# Patient Record
Sex: FEMALE | Race: OTHER | HISPANIC OR LATINO
[De-identification: names, ages, dates, MRNs, and addresses within clinical notes are randomized per-mention and may not be internally consistent; named-entity substitution may affect disease eponyms.]

---

## 2023-03-28 ENCOUNTER — NON-APPOINTMENT (OUTPATIENT)
Age: 48
End: 2023-03-28

## 2023-03-28 PROBLEM — Z00.00 ENCOUNTER FOR PREVENTIVE HEALTH EXAMINATION: Status: ACTIVE | Noted: 2023-03-28

## 2023-04-13 NOTE — PHYSICAL EXAM
[Supple] : supple [No Supraclavicular Adenopathy] : no supraclavicular adenopathy [Examined in the supine and seated position] : examined in the supine and seated position [No dominant masses] : no dominant masses in right breast  [No dominant masses] : no dominant masses left breast [No Nipple Retraction] : no left nipple retraction [No Nipple Discharge] : no left nipple discharge [No Axillary Lymphadenopathy] : no left axillary lymphadenopathy

## 2023-04-17 ENCOUNTER — APPOINTMENT (OUTPATIENT)
Dept: BREAST CENTER | Facility: CLINIC | Age: 48
End: 2023-04-17
Payer: COMMERCIAL

## 2023-04-17 VITALS
BODY MASS INDEX: 25.87 KG/M2 | HEART RATE: 73 BPM | HEIGHT: 63 IN | SYSTOLIC BLOOD PRESSURE: 121 MMHG | WEIGHT: 146 LBS | OXYGEN SATURATION: 98 % | DIASTOLIC BLOOD PRESSURE: 77 MMHG

## 2023-04-17 DIAGNOSIS — Z80.42 FAMILY HISTORY OF MALIGNANT NEOPLASM OF PROSTATE: ICD-10-CM

## 2023-04-17 DIAGNOSIS — Z78.9 OTHER SPECIFIED HEALTH STATUS: ICD-10-CM

## 2023-04-17 DIAGNOSIS — Z80.0 FAMILY HISTORY OF MALIGNANT NEOPLASM OF DIGESTIVE ORGANS: ICD-10-CM

## 2023-04-17 PROCEDURE — 99204 OFFICE O/P NEW MOD 45 MIN: CPT

## 2023-04-20 NOTE — DATA REVIEWED
[FreeTextEntry1] : 1/11/23 ([facility]) B/L sMMG: heterogeneously dense. \par 1. Faint microcalcifications with questioned associated distortion left inner central to upper breast, middle to posterior thirds, approximately 8 cm deep to the nipple. Recommend diagnostic left mammogram with full 2-D and 3-D tomographic 90 degree lateral view and magnification views in CC and two-minute delayed 90 degree lateral projections, as well as ultrasound correlation.\par 2. No significant finding in the right breast.\par 3. At the time of additional imaging, bilateral whole breast ultrasound recommended due to the density of the breasts.\par FOLLOW-UP: Additional imaging. Recommend diagnostic left mammogram and bilateral whole breast ultrasound. BI-RADS Category 0:  Incomplete. Need additional imaging evaluation.\par \par 3/1/23 ([facility]) L DX MMG/US: heterogeneously dense. A biopsy clip is in the medial left breast. In the 10 o'clock position of the left breast 6 cm from the nipple, there are clustered microcalcifications; indeterminate. There are a few scattered cysts in both breasts, some with septations and/or debris. FOLLOW-UP: L Stereotactic biopsy. BI-RADS 4:  Suspicious.\par \par 3/13/23 ([facility]) Stereotactic biopsy of calcifications in the upper inner quadrant of the left breast (cork clip): Fibrocystic and columnar cell changes with associated microcalcifications and atypical lobular hyperplasia. High risk, concordant. Surgical consultation is recommended.

## 2023-04-20 NOTE — HISTORY OF PRESENT ILLNESS
[FreeTextEntry1] : 48 year old female was referred by Dr. Autumn Perez (OBGYN) who presents for initial evaluation s/p benign biopsy on 3/13/23 of indeterminate calcifications in the upper inner quadrant of the left breast, pathology revealed fibrocystic and columnar cell changes with associated microcalcifications and atypical lobular hyperplasia. Patient reports bilateral breast pain, described as an ache, sometimes sharp/shooting, unsure relation to menstrual cycles as she has been irregular. Patient denies palpable masses, nipple discharge, skin changes. \par \par Patient with history of right breast surgical biopsy >20 years ago with reported benign results and history of L breast biopsy in 2019 also reportedly benign. \par \par Patient denies family history of breast cancer. Denies famhx of ovarian cancer. \par \par Evelyn Lifetime Risk 41.3%\par

## 2023-04-20 NOTE — ASSESSMENT
[FreeTextEntry1] : 48 year old female presents for initial evaluation s/p benign biopsy on 3/13/23 of indeterminate calcifications in the upper inner quadrant of the left breast, pathology revealed fibrocystic and columnar cell changes with associated microcalcifications and atypical lobular hyperplasia. \par \par Given diagnosis of atypia, lifetime TOR risk of 41.3%. Discussed with the patient the implications for her lifetime risk, which is considered to be at high risk to develop breast cancer over the span of her lifetime and it is recommended that she undergoes high risk screening surveillance to include biannual radiological screening exams with a mammogram and screening MRI.\par \par Discussed the standard recommendation to limit alcohol intake, follow a low-fat diet, avoid smoking. Reviewed prevention options from lifestyle, radiological surveillance, anti-estrogen maintenance, and prophylactic mastectomy. \par \par Patient reports bilateral breast pain. Discussed with the patient that mastalgia is not a common sign of breast cancer. I reviewed the use of decreasing caffeine intake (includes coffee, tea, chocolate, energy drinks, soda), wearing a sports bra, evening primrose oil (high dose EPO handout was provided), and alternating cold/warm compresses.\par \par Physical exam WNL. Will refer patient to med onc to discuss chemoprevention. Plan for high risk B/L MRI and re-examination with PA in Sept 2023. Patient verbalizes understanding and is in agreement with the plan.

## 2023-04-20 NOTE — PAST MEDICAL HISTORY
[Menarche Age ____] : age at menarche was [unfilled] [Definite ___ (Date)] : the last menstrual period was [unfilled] [Total Preg ___] : G[unfilled] [Live Births ___] : P[unfilled]  [AB Spont ___] : miscarriages: [unfilled]  [Age At Live Birth ___] : Age at live birth: [unfilled] [History of Hormone Replacement Treatment] : has no history of hormone replacement treatment [FreeTextEntry6] : No [FreeTextEntry7] : No [FreeTextEntry8] : No

## 2023-04-25 ENCOUNTER — APPOINTMENT (OUTPATIENT)
Dept: OBGYN | Facility: CLINIC | Age: 48
End: 2023-04-25
Payer: COMMERCIAL

## 2023-04-25 VITALS
HEART RATE: 78 BPM | BODY MASS INDEX: 26.05 KG/M2 | DIASTOLIC BLOOD PRESSURE: 79 MMHG | SYSTOLIC BLOOD PRESSURE: 127 MMHG | OXYGEN SATURATION: 97 % | WEIGHT: 147 LBS | HEIGHT: 63 IN

## 2023-04-25 DIAGNOSIS — Z98.890 OTHER SPECIFIED POSTPROCEDURAL STATES: ICD-10-CM

## 2023-04-25 PROCEDURE — 99205 OFFICE O/P NEW HI 60 MIN: CPT

## 2023-05-01 ENCOUNTER — APPOINTMENT (OUTPATIENT)
Dept: HEMATOLOGY ONCOLOGY | Facility: CLINIC | Age: 48
End: 2023-05-01
Payer: COMMERCIAL

## 2023-05-01 VITALS
HEART RATE: 81 BPM | TEMPERATURE: 99.1 F | OXYGEN SATURATION: 98 % | BODY MASS INDEX: 25.69 KG/M2 | RESPIRATION RATE: 18 BRPM | DIASTOLIC BLOOD PRESSURE: 81 MMHG | SYSTOLIC BLOOD PRESSURE: 123 MMHG | WEIGHT: 145 LBS | HEIGHT: 63 IN

## 2023-05-01 PROCEDURE — 99203 OFFICE O/P NEW LOW 30 MIN: CPT

## 2023-05-01 RX ORDER — OMEGA-3/DHA/EPA/FISH OIL 300-1000MG
CAPSULE ORAL
Refills: 0 | Status: ACTIVE | COMMUNITY

## 2023-05-01 RX ORDER — ACETAMINOPHEN 325 MG/1
TABLET, FILM COATED ORAL
Refills: 0 | Status: ACTIVE | COMMUNITY

## 2023-05-01 RX ORDER — TURMERIC ROOT EXTRACT 500 MG
TABLET ORAL
Refills: 0 | Status: ACTIVE | COMMUNITY

## 2023-05-01 RX ORDER — GABAPENTIN 300 MG/1
300 CAPSULE ORAL
Qty: 90 | Refills: 0 | Status: DISCONTINUED | COMMUNITY
Start: 2022-11-23 | End: 2023-05-01

## 2023-05-01 RX ORDER — TIZANIDINE 2 MG/1
2 TABLET ORAL
Qty: 60 | Refills: 0 | Status: DISCONTINUED | COMMUNITY
Start: 2022-12-09 | End: 2023-05-01

## 2023-05-01 NOTE — HISTORY OF PRESENT ILLNESS
[de-identified] : 48 year old female with newly diagnosed atypical lobular hyperplasia was referred by ROSE Mcgill for chemoprevention.\par \par Patient with history of right breast surgical biopsy >20 years ago with reported benign results and history of L breast biopsy in 2019 also reportedly benign. \par \par Patient denies family history of breast cancer. Denies famhx of ovarian cancer. \par \par 1/11/23 ([facility]) B/L sMMG: heterogeneously dense. \par 1. Faint microcalcifications with questioned associated distortion left inner central to upper breast, middle to posterior thirds, approximately 8 cm deep to the nipple. \par 2. No significant finding in the right breast.\par BI-RADS Category 0\par \par 3/1/23 ([facility]) L DX MMG/US: heterogeneously dense. A biopsy clip is in the medial left breast. In the 10 o'clock position of the left breast 6 cm from the nipple, there are clustered microcalcifications; indeterminate. There are a few scattered cysts in both breasts, some with septations and/or debris. BI-RADS 4: Suspicious.\par \par 3/13/23 ([facility]) Stereotactic biopsy of calcifications in the upper inner quadrant of the left breast (cork clip): Fibrocystic and columnar cell changes with associated microcalcifications and atypical lobular hyperplasia. High risk, concordant.  [de-identified] : She saw gyn Mono Black on 4/25/23 for myomectomy and Essure removal.  Reported bone pain and "feeling like an old lady."

## 2023-05-03 ENCOUNTER — NON-APPOINTMENT (OUTPATIENT)
Age: 48
End: 2023-05-03

## 2023-05-03 PROBLEM — Z98.890 S/P BREAST BIOPSY, LEFT: Status: RESOLVED | Noted: 2023-04-13 | Resolved: 2023-05-03

## 2023-05-03 NOTE — HISTORY OF PRESENT ILLNESS
[Patient reported mammogram was abnormal] : Patient reported mammogram was abnormal [Patient reported breast sonogram was abnormal] : Patient reported breast sonogram was abnormal [Patient reported PAP Smear was normal] : Patient reported PAP Smear was normal [Patient reported colonoscopy was normal] : Patient reported colonoscopy was normal [perimenopausal] : perimenopausal [N] : Patient is not sexually active [Y] : Positive pregnancy history [Frequency: Q ___ days] : menstrual periods occur approximately every [unfilled] days [Menarche Age: ____] : age at menarche was [unfilled] [Currently Active] : currently active [Men] : men [Vaginal] : vaginal [Mammogramdate] : 03/23 [BreastSonogramDate] : 03/23 [TextBox_25] : Also had LT breast biopsy  [PapSmeardate] : 01/23 [ColonoscopyDate] : 01/20 [LMPDate] : 02/01/23 [MensesFreq] : 20-90 [PGHxTotal] : 2 [[facility] Hospital[facility]] : 2 [FreeTextEntry1] : 02/01/23

## 2023-05-03 NOTE — CONSULT LETTER
[Dear  ___] : Dear  [unfilled], [( Thank you for referring [unfilled] for consultation for _____ )] : Thank you for referring [unfilled] for consultation for [unfilled] [Please see my note below.] : Please see my note below. [Consult Closing:] : Thank you very much for allowing me to participate in the care of this patient.  If you have any questions, please do not hesitate to contact me. [Sincerely,] : Sincerely, [FreeTextEntry2] : Autumn Perez MD \par [address] [place] [address], [address] \[place] [address]\par  [FreeTextEntry3] : Mono Black MD, FACOG, FACS \par Minimally Invasive Gynecologic Surgery \par [facility] \par [address] [place] \[place] [address] \par Tel: [phone] \par Fax: [phone]\par

## 2023-05-03 NOTE — PHYSICAL EXAM
[Chaperone Present] : A chaperone was present in the examining room during all aspects of the physical examination [Appropriately responsive] : appropriately responsive [Alert] : alert [No Acute Distress] : no acute distress [Oriented x3] : oriented x3 [Tenderness] : tender [Labia Majora] : normal [Labia Minora] : normal [Normal] : normal [Anteversion] : anteverted [Uterine Adnexae] : normal

## 2023-05-03 NOTE — DISCUSSION/SUMMARY
[FreeTextEntry1] : I sat down with the patient to discuss the imaging findings & her symptoms which warrant intervention. I explained that the pelvic pain may be related to ESSURE device but also adenomyosis. We discussed medication for management of pelvic pain such as changing oral contraceptive pill, NuvaRing, LNG IUD, Depo Provera, Orillisa and Depo Lupron and pain management with NSAIDs, and surgical intervention with removal of device with cornual resection and myomectomy vs. partial hysterectomy. I explained that the healing from partial hysterectomy would be quicker and less bleeding. The options for surgical approach including open, vaginal, and laparoscopic with or without robotic assistance        The differential diagnosis was discussed in detail. \par \par The indications, risks, benefits and alternatives were discussed. but not limited to, conversion to laparotomy, bleeding, infection, injury to surrounding organs was discussed at length. Chance of occult injury and need for future surgery. KASSANDRA ARTEAGA expressed an understanding of the treatment rationale and her questions were answered to her apparent satisfaction.  KASSANDRA ARTEAGA was given written information about endometriosis and surgery, diagrams of pelvic anatomy.  All questions and concerns addressed, she expressed understanding. She wants to discuss with her  before making a decision.

## 2023-06-08 ENCOUNTER — TRANSCRIPTION ENCOUNTER (OUTPATIENT)
Age: 48
End: 2023-06-08

## 2023-06-08 NOTE — ASU PATIENT PROFILE, ADULT - ABLE TO REACH PT
Voice message: arrival time is at 7:45 am ,  Procedure time is at 9:45 am , must remain NPO/NO solid foods, dairy , sweet after 2200 pm tonight. allow to drink water apple juice till 12Mn, if takes medication for blood pressure, thyroid, , plase take medicined with sip of water before arriving to the hospital. if takes anticoagulants,  must call your MD for advise of the medicine  to take or not. Dress comfortable,  leave all valuable at home.  Bring id photo and insurance cards, address and telephone given to patient, escort arrange must be 18  years of age or  older message in German and English/no

## 2023-06-08 NOTE — ASU PATIENT PROFILE, ADULT - NSICDXPASTSURGICALHX_GEN_ALL_CORE_FT
PAST SURGICAL HISTORY:  S/P breast lumpectomy      PAST SURGICAL HISTORY:  S/P breast lumpectomy right (benign)

## 2023-06-08 NOTE — ASU PATIENT PROFILE, ADULT - NSICDXPASTMEDICALHX_GEN_ALL_CORE_FT
PAST MEDICAL HISTORY:  No pertinent past medical history      PAST MEDICAL HISTORY:  Fibromyalgia     Heart burn

## 2023-06-08 NOTE — ASU PATIENT PROFILE, ADULT - FALL HARM RISK - UNIVERSAL INTERVENTIONS
Bed in lowest position, wheels locked, appropriate side rails in place/Call bell, personal items and telephone in reach/Instruct patient to call for assistance before getting out of bed or chair/Non-slip footwear when patient is out of bed/[place] to call system/Physically safe environment - no spills, clutter or unnecessary equipment/Purposeful Proactive Rounding/Room/bathroom lighting operational, light cord in reach

## 2023-06-09 ENCOUNTER — OUTPATIENT (OUTPATIENT)
Dept: OUTPATIENT SERVICES | Facility: HOSPITAL | Age: 48
LOS: 1 days | Discharge: HOME | End: 2023-06-09
Payer: COMMERCIAL

## 2023-06-09 ENCOUNTER — TRANSCRIPTION ENCOUNTER (OUTPATIENT)
Age: 48
End: 2023-06-09

## 2023-06-09 ENCOUNTER — APPOINTMENT (OUTPATIENT)
Dept: OBGYN | Facility: AMBULATORY SURGERY CENTER | Age: 48
End: 2023-06-09

## 2023-06-09 ENCOUNTER — RESULT REVIEW (OUTPATIENT)
Age: 48
End: 2023-06-09

## 2023-06-09 VITALS — OXYGEN SATURATION: 100 % | TEMPERATURE: 99 F | HEART RATE: 94 BPM | RESPIRATION RATE: 17 BRPM

## 2023-06-09 VITALS
WEIGHT: 141.32 LBS | HEIGHT: 63 IN | OXYGEN SATURATION: 100 % | HEART RATE: 89 BPM | TEMPERATURE: 98 F | RESPIRATION RATE: 16 BRPM | DIASTOLIC BLOOD PRESSURE: 76 MMHG | SYSTOLIC BLOOD PRESSURE: 124 MMHG

## 2023-06-09 DIAGNOSIS — Z98.89 OTHER SPECIFIED POSTPROCEDURAL STATES: Chronic | ICD-10-CM

## 2023-06-09 PROCEDURE — 58543 LSH UTERUS ABOVE 250 G: CPT

## 2023-06-09 PROCEDURE — 88307 TISSUE EXAM BY PATHOLOGIST: CPT | Mod: 26

## 2023-06-09 PROCEDURE — 88304 TISSUE EXAM BY PATHOLOGIST: CPT | Mod: 26

## 2023-06-09 RX ORDER — ACETAMINOPHEN 500 MG
1000 TABLET ORAL ONCE
Refills: 0 | Status: COMPLETED | OUTPATIENT
Start: 2023-06-09 | End: 2023-06-09

## 2023-06-09 RX ORDER — OXYCODONE HYDROCHLORIDE 5 MG/1
5 TABLET ORAL ONCE
Refills: 0 | Status: DISCONTINUED | OUTPATIENT
Start: 2023-06-09 | End: 2023-06-09

## 2023-06-09 RX ORDER — KETOROLAC TROMETHAMINE 30 MG/ML
30 SYRINGE (ML) INJECTION ONCE
Refills: 0 | Status: DISCONTINUED | OUTPATIENT
Start: 2023-06-09 | End: 2023-06-09

## 2023-06-09 RX ORDER — SODIUM CHLORIDE 9 MG/ML
500 INJECTION, SOLUTION INTRAVENOUS
Refills: 0 | Status: DISCONTINUED | OUTPATIENT
Start: 2023-06-09 | End: 2023-06-09

## 2023-06-09 RX ORDER — FENTANYL CITRATE 50 UG/ML
25 INJECTION INTRAVENOUS
Refills: 0 | Status: DISCONTINUED | OUTPATIENT
Start: 2023-06-09 | End: 2023-06-09

## 2023-06-09 RX ORDER — ONDANSETRON 8 MG/1
4 TABLET, FILM COATED ORAL ONCE
Refills: 0 | Status: DISCONTINUED | OUTPATIENT
Start: 2023-06-09 | End: 2023-06-09

## 2023-06-09 RX ORDER — APREPITANT 80 MG/1
40 CAPSULE ORAL ONCE
Refills: 0 | Status: COMPLETED | OUTPATIENT
Start: 2023-06-09 | End: 2023-06-09

## 2023-06-09 RX ORDER — HYDROMORPHONE HYDROCHLORIDE 2 MG/ML
0.5 INJECTION INTRAMUSCULAR; INTRAVENOUS; SUBCUTANEOUS ONCE
Refills: 0 | Status: DISCONTINUED | OUTPATIENT
Start: 2023-06-09 | End: 2023-06-09

## 2023-06-09 RX ORDER — DIPHENHYDRAMINE HCL 50 MG
12.5 CAPSULE ORAL ONCE
Refills: 0 | Status: DISCONTINUED | OUTPATIENT
Start: 2023-06-09 | End: 2023-06-09

## 2023-06-09 RX ORDER — DIAZEPAM 5 MG
2.5 TABLET ORAL ONCE
Refills: 0 | Status: DISCONTINUED | OUTPATIENT
Start: 2023-06-09 | End: 2023-06-09

## 2023-06-09 RX ORDER — OXYCODONE 5 MG/1
5 TABLET ORAL
Qty: 5 | Refills: 0 | Status: ACTIVE | COMMUNITY
Start: 2023-06-09 | End: 1900-01-01

## 2023-06-09 RX ORDER — NAPROXEN 500 MG/1
500 TABLET ORAL
Qty: 1 | Refills: 2 | Status: ACTIVE | COMMUNITY
Start: 2023-06-09 | End: 1900-01-01

## 2023-06-09 RX ORDER — CHLORHEXIDINE GLUCONATE 213 G/1000ML
1 SOLUTION TOPICAL ONCE
Refills: 0 | Status: COMPLETED | OUTPATIENT
Start: 2023-06-09 | End: 2023-06-09

## 2023-06-09 RX ADMIN — SODIUM CHLORIDE 100 MILLILITER(S): 9 INJECTION, SOLUTION INTRAVENOUS at 15:17

## 2023-06-09 RX ADMIN — FENTANYL CITRATE 25 MICROGRAM(S): 50 INJECTION INTRAVENOUS at 13:55

## 2023-06-09 RX ADMIN — CHLORHEXIDINE GLUCONATE 1 APPLICATION(S): 213 SOLUTION TOPICAL at 09:23

## 2023-06-09 RX ADMIN — OXYCODONE HYDROCHLORIDE 5 MILLIGRAM(S): 5 TABLET ORAL at 16:26

## 2023-06-09 RX ADMIN — HYDROMORPHONE HYDROCHLORIDE 0.5 MILLIGRAM(S): 2 INJECTION INTRAMUSCULAR; INTRAVENOUS; SUBCUTANEOUS at 16:55

## 2023-06-09 RX ADMIN — APREPITANT 40 MILLIGRAM(S): 80 CAPSULE ORAL at 09:20

## 2023-06-09 RX ADMIN — FENTANYL CITRATE 25 MICROGRAM(S): 50 INJECTION INTRAVENOUS at 13:45

## 2023-06-09 RX ADMIN — HYDROMORPHONE HYDROCHLORIDE 0.5 MILLIGRAM(S): 2 INJECTION INTRAMUSCULAR; INTRAVENOUS; SUBCUTANEOUS at 16:36

## 2023-06-09 RX ADMIN — FENTANYL CITRATE 25 MICROGRAM(S): 50 INJECTION INTRAVENOUS at 14:00

## 2023-06-09 RX ADMIN — Medication 30 MILLIGRAM(S): at 14:30

## 2023-06-09 RX ADMIN — Medication 30 MILLIGRAM(S): at 14:45

## 2023-06-09 RX ADMIN — Medication 1000 MILLIGRAM(S): at 09:21

## 2023-06-09 RX ADMIN — OXYCODONE HYDROCHLORIDE 5 MILLIGRAM(S): 5 TABLET ORAL at 15:56

## 2023-06-09 RX ADMIN — FENTANYL CITRATE 25 MICROGRAM(S): 50 INJECTION INTRAVENOUS at 13:40

## 2023-06-09 RX ADMIN — Medication 2.5 MILLIGRAM(S): at 15:15

## 2023-06-09 NOTE — BRIEF OPERATIVE NOTE - OPERATION/FINDINGS
Infraumbilical omega incision created and abdomen entered via open technique. Gelport inserted without difficulty and abdomen insufflated to 12mmHg Infraumbilical omega incision created and abdomen entered via open technique. Gelport inserted without difficulty and abdomen insufflated to 12mmHg. LLQ x1 and RLQx2 5mm assist ports placed under direct visualization. Pelvic exam significant for irregularly enlarged uterus, approx 9w, grossly normal appearing uterus, b/l fallopian tubes, and ovaries. Ligasure device used to perform b/l salpingectomy and ligation of uterine arteries. Monopolar hook used to amputate the uterus supracervically. Endocervical canal cauterized w/ monopolar hook. Specimen placed in endocatch bag and brought to the umbilicus. Specimen morcellated and removed. Pelvis examined and irrigated, excellent hemostasis. Fascia closed w/ 0 vicryl. Skin closed w/ monocryl and dermabond. Incisions injected with marcaine 0.5%

## 2023-06-09 NOTE — ASU DISCHARGE PLAN (ADULT/PEDIATRIC) - CARE PROVIDER_API CALL
Mono Black  Obstetrics and Gynecology  [address] [address]  Phone: [phone]  Fax: [phone]  Follow Up Time:

## 2023-06-09 NOTE — BRIEF OPERATIVE NOTE - NSICDXBRIEFPROCEDURE_GEN_ALL_CORE_FT
PROCEDURES:  Hysterectomy, supracervical 09-Jun-2023 13:23:49  Yogesh Juarez  Salpingectomy, bilateral, total, laparoscopic 09-Jun-2023 13:24:04  Yogesh Juarez

## 2023-06-09 NOTE — PRE-ANESTHESIA EVALUATION ADULT - NSANTHPMHFT_GEN_ALL_CORE
denies h/o cardiopulm dz, CVA, DM, SZ d/o, CHRIS, GERD, DVT/PE, kidney dz, liver dz, blood dyscrasias

## 2023-06-09 NOTE — ASU DISCHARGE PLAN (ADULT/PEDIATRIC) - NS MD DC FALL RISK RISK
For information on Fall & Injury Prevention, visit: [email or web address] OR  [email or web address] OR  https://www.cdc.gov/steadi/patient.html

## 2023-06-09 NOTE — BRIEF OPERATIVE NOTE - NSICDXBRIEFPOSTOP_GEN_ALL_CORE_FT
POST-OP DIAGNOSIS:  Fibroids 09-Jun-2023 13:24:33  Yogesh Juarez  Enlarged uterus 09-Jun-2023 13:24:40  Yogesh Juarez  Endometriosis 09-Jun-2023 13:24:45  Yogesh Juarez

## 2023-06-09 NOTE — BRIEF OPERATIVE NOTE - NSICDXBRIEFPREOP_GEN_ALL_CORE_FT
PRE-OP DIAGNOSIS:  Fibroids 09-Jun-2023 13:24:12  Yogesh Juarez  Pain pelvic 09-Jun-2023 13:24:16  Yogesh Juarez  Enlarged uterus 09-Jun-2023 13:24:23  Yogesh Juarez

## 2023-06-12 ENCOUNTER — NON-APPOINTMENT (OUTPATIENT)
Age: 48
End: 2023-06-12

## 2023-06-16 LAB — SURGICAL PATHOLOGY STUDY: SIGNIFICANT CHANGE UP

## 2023-06-19 ENCOUNTER — NON-APPOINTMENT (OUTPATIENT)
Age: 48
End: 2023-06-19

## 2023-06-27 ENCOUNTER — APPOINTMENT (OUTPATIENT)
Dept: OBGYN | Facility: CLINIC | Age: 48
End: 2023-06-27
Payer: COMMERCIAL

## 2023-06-27 VITALS
HEART RATE: 80 BPM | HEIGHT: 63 IN | SYSTOLIC BLOOD PRESSURE: 114 MMHG | BODY MASS INDEX: 24.8 KG/M2 | DIASTOLIC BLOOD PRESSURE: 74 MMHG | WEIGHT: 140 LBS

## 2023-06-27 DIAGNOSIS — Z86.018 PERSONAL HISTORY OF OTHER BENIGN NEOPLASM: ICD-10-CM

## 2023-06-27 DIAGNOSIS — Z41.9 ENCOUNTER FOR PROCEDURE FOR PURPOSES OTHER THAN REMEDYING HEALTH STATE, UNSPECIFIED: ICD-10-CM

## 2023-06-27 DIAGNOSIS — R10.2 PELVIC AND PERINEAL PAIN: ICD-10-CM

## 2023-06-27 DIAGNOSIS — R30.0 DYSURIA: ICD-10-CM

## 2023-06-27 DIAGNOSIS — Z31.0 ENCOUNTER FOR REVERSAL OF PREVIOUS STERILIZATION: ICD-10-CM

## 2023-06-27 DIAGNOSIS — Z90.710 ACQUIRED ABSENCE OF BOTH CERVIX AND UTERUS: ICD-10-CM

## 2023-06-27 DIAGNOSIS — N89.8 OTHER SPECIFIED NONINFLAMMATORY DISORDERS OF VAGINA: ICD-10-CM

## 2023-06-27 DIAGNOSIS — G89.29 PELVIC AND PERINEAL PAIN: ICD-10-CM

## 2023-06-27 PROCEDURE — 99024 POSTOP FOLLOW-UP VISIT: CPT

## 2023-06-27 RX ORDER — TINIDAZOLE 500 MG/1
500 TABLET, FILM COATED ORAL
Qty: 8 | Refills: 1 | Status: ACTIVE | COMMUNITY
Start: 2023-06-27 | End: 1900-01-01

## 2023-06-27 NOTE — HISTORY OF PRESENT ILLNESS
[FreeTextEntry1] : 48 year yo presents for postoperative visit s/p Laparoscopic supracervical hysterectomy, bilateral salpingectomy on 06/09/23.  She reports regular voiding and bowel movements, denies fevers/chills/dysuria.  Not taking pain medication. Patient states this morning when she urinated she felt some burning and has had some discharge with odor.

## 2023-06-27 NOTE — DISCUSSION/SUMMARY
[FreeTextEntry1] : 48 year old s/p Laparoscopic supracervical hysterectomy, bilateral salpingectomy. presents for postoperative visit doing well \par -given copy of pathology and operative report \par -continue pelvic rest with modified activity \par -f/u vaginal culture, treated for presumed bacterial vaginosis\par -follow up 2 weeks \par \par

## 2023-06-27 NOTE — PHYSICAL EXAM
[Chaperone Present] : A chaperone was present in the examining room during all aspects of the physical examination [Appropriately responsive] : appropriately responsive [Alert] : alert [No Acute Distress] : no acute distress [Soft] : soft [Non-tender] : non-tender [Non-distended] : non-distended [Oriented x3] : oriented x3 [FreeTextEntry7] : incisions clean, dry, intact  [Labia Majora] : normal [Labia Minora] : normal [Normal] : normal [Discharge] : discharge [Moderate] : moderate [Yohana] : yellow [Thin] : thin [Absent] : absent [Uterine Adnexae] : normal

## 2023-06-29 LAB
BACTERIA UR CULT: NORMAL
CANDIDA VAG CYTO: NOT DETECTED
G VAGINALIS+PREV SP MTYP VAG QL MICRO: NOT DETECTED
T VAGINALIS VAG QL WET PREP: NOT DETECTED

## 2023-06-30 ENCOUNTER — NON-APPOINTMENT (OUTPATIENT)
Age: 48
End: 2023-06-30

## 2023-07-18 ENCOUNTER — APPOINTMENT (OUTPATIENT)
Dept: OBGYN | Facility: CLINIC | Age: 48
End: 2023-07-18
Payer: COMMERCIAL

## 2023-07-18 VITALS — HEART RATE: 77 BPM | HEIGHT: 63 IN | OXYGEN SATURATION: 99 % | WEIGHT: 142 LBS | BODY MASS INDEX: 25.16 KG/M2

## 2023-07-18 DIAGNOSIS — M62.89 OTHER SPECIFIED DISORDERS OF MUSCLE: ICD-10-CM

## 2023-07-18 DIAGNOSIS — N80.9 ENDOMETRIOSIS, UNSPECIFIED: ICD-10-CM

## 2023-07-18 DIAGNOSIS — R10.2 PELVIC AND PERINEAL PAIN: ICD-10-CM

## 2023-07-18 PROBLEM — R12 HEARTBURN: Chronic | Status: ACTIVE | Noted: 2023-06-09

## 2023-07-18 PROBLEM — M79.7 FIBROMYALGIA: Chronic | Status: ACTIVE | Noted: 2023-06-09

## 2023-07-18 PROCEDURE — 99024 POSTOP FOLLOW-UP VISIT: CPT

## 2023-07-18 NOTE — COUNSELING
[Lab Results] : lab results [Medication Management] : medication management [Pre/Post Op Instructions] : pre/post op instructions [Other ___] : [unfilled]

## 2023-07-19 LAB
CANDIDA VAG CYTO: NOT DETECTED
G VAGINALIS+PREV SP MTYP VAG QL MICRO: NOT DETECTED
T VAGINALIS VAG QL WET PREP: NOT DETECTED

## 2023-07-19 NOTE — HISTORY OF PRESENT ILLNESS
[FreeTextEntry1] : 48 year yo presents for postoperative visit s/p Laparoscopic supracervical hysterectomy, bilateral salpingectomy  on 6/9/23.  She  denies fevers/chills/dysuria.  Not taking pain medication. Today she woke up with abdominal discomfort. Patient is having a hard time moving her bowels if she does not take stool softener. her last bowel movement was yesterday, she took 3 stool softeners. She is having some vaginal discharge.

## 2023-07-19 NOTE — PHYSICAL EXAM
[Chaperone Present] : A chaperone was present in the examining room during all aspects of the physical examination [Appropriately responsive] : appropriately responsive [Alert] : alert [No Acute Distress] : no acute distress [Soft] : soft [Non-tender] : non-tender [Non-distended] : non-distended [Oriented x3] : oriented x3 [FreeTextEntry7] : incision clean, dry, intact [Labia Majora] : normal [Labia Minora] : normal [Normal] : normal [Discharge] : discharge [Scant] : scant [White] : white [Thin] : thin [Absent] : absent [Uterine Adnexae] : normal

## 2023-07-20 ENCOUNTER — NON-APPOINTMENT (OUTPATIENT)
Age: 48
End: 2023-07-20

## 2023-07-20 LAB — BACTERIA UR CULT: NORMAL

## 2023-09-08 ENCOUNTER — NON-APPOINTMENT (OUTPATIENT)
Age: 48
End: 2023-09-08

## 2023-09-12 ENCOUNTER — APPOINTMENT (OUTPATIENT)
Dept: OBGYN | Facility: CLINIC | Age: 48
End: 2023-09-12

## 2023-09-18 ENCOUNTER — APPOINTMENT (OUTPATIENT)
Dept: BREAST CENTER | Facility: CLINIC | Age: 48
End: 2023-09-18
Payer: COMMERCIAL

## 2023-09-18 VITALS
OXYGEN SATURATION: 98 % | SYSTOLIC BLOOD PRESSURE: 124 MMHG | DIASTOLIC BLOOD PRESSURE: 75 MMHG | WEIGHT: 149 LBS | HEART RATE: 91 BPM | BODY MASS INDEX: 26.39 KG/M2 | TEMPERATURE: 98.3 F

## 2023-09-18 PROCEDURE — 99213 OFFICE O/P EST LOW 20 MIN: CPT

## 2023-09-28 ENCOUNTER — APPOINTMENT (OUTPATIENT)
Dept: GASTROENTEROLOGY | Facility: CLINIC | Age: 48
End: 2023-09-28

## 2023-12-12 NOTE — ASU DISCHARGE PLAN (ADULT/PEDIATRIC) - ACTIVITY LEVEL
No heavy lifting/No sports/gym/Nothing per vagina/No tub baths/No douching/No tampons/No intercourse none

## 2024-01-30 ENCOUNTER — NON-APPOINTMENT (OUTPATIENT)
Age: 49
End: 2024-01-30

## 2024-02-05 ENCOUNTER — APPOINTMENT (OUTPATIENT)
Dept: BREAST CENTER | Facility: CLINIC | Age: 49
End: 2024-02-05

## 2024-02-22 ENCOUNTER — NON-APPOINTMENT (OUTPATIENT)
Age: 49
End: 2024-02-22

## 2024-03-12 PROBLEM — Z91.89 AT HIGH RISK FOR BREAST CANCER: Status: ACTIVE | Noted: 2023-04-13

## 2024-03-12 PROBLEM — Z12.39 BREAST CANCER SCREENING: Status: ACTIVE | Noted: 2024-03-12

## 2024-03-12 PROBLEM — N60.92 ATYPICAL LOBULAR HYPERPLASIA (ALH) OF LEFT BREAST: Status: ACTIVE | Noted: 2023-04-13

## 2024-03-12 NOTE — ASSESSMENT
[FreeTextEntry1] : 48 year old female presents for high risk screening with history of ALH.   Given diagnosis of atypia, lifetime TOR risk of 41.3%. Discussed with the patient the implications for her lifetime risk, which is considered to be at high risk to develop breast cancer over the span of her lifetime and it is recommended that she undergoes high risk screening surveillance to include biannual radiological screening exams with a mammogram and screening MRI.   Patient was referred to med onc Dr. Mace, discussed risk reduction via chemoprevention and was recommended Tamoxifen however patient deferred; prefers to be monitored closely via active surveillance.   Physical exam WNL. Most recent imaging reviewed: B/L sMMG/US 2/20/24 BIRADS-2. Plan for B/L high risk MRI and re-examination in Sept 2024. Also again discussed patient's strong famhx for colon cancer, will again refer to GI specialist for colon cancer screening via [facility]. Patient verbalizes understanding and is in agreement with the plan.

## 2024-03-12 NOTE — PLAN
[TextEntry] : -B/L MRI in Sept 2024 -RTC in Sept 2024  -referral GI specialist for colon cancer screening via [facility]

## 2024-03-12 NOTE — PHYSICAL EXAM
[Supple] : supple [Examined in the supine and seated position] : examined in the supine and seated position [No Supraclavicular Adenopathy] : no supraclavicular adenopathy [No dominant masses] : no dominant masses in right breast  [No dominant masses] : no dominant masses left breast [No Nipple Retraction] : no right nipple retraction [No Nipple Discharge] : no left nipple discharge [No Axillary Lymphadenopathy] : no left axillary lymphadenopathy

## 2024-03-12 NOTE — DATA REVIEWED
[FreeTextEntry1] : 1/11/23 ([facility]) B/L sMMG: heterogeneously dense.  1. Faint microcalcifications with questioned associated distortion left inner central to upper breast, middle to posterior thirds, approximately 8 cm deep to the nipple. Recommend diagnostic left mammogram with full 2-D and 3-D tomographic 90 degree lateral view and magnification views in CC and two-minute delayed 90 degree lateral projections, as well as ultrasound correlation. 2. No significant finding in the right breast. 3. At the time of additional imaging, bilateral whole breast ultrasound recommended due to the density of the breasts. FOLLOW-UP: Additional imaging. Recommend diagnostic left mammogram and bilateral whole breast ultrasound. BI-RADS Category 0:  Incomplete. Need additional imaging evaluation.  3/1/23 ([facility]) L DX MMG/US: heterogeneously dense. A biopsy clip is in the medial left breast. In the 10 o'clock position of the left breast 6 cm from the nipple, there are clustered microcalcifications; indeterminate. There are a few scattered cysts in both breasts, some with septations and/or debris. FOLLOW-UP: L Stereotactic biopsy. BI-RADS 4:  Suspicious.  3/13/23 ([facility]) Stereotactic biopsy of calcifications in the upper inner quadrant of the left breast (cork clip): Fibrocystic and columnar cell changes with associated microcalcifications and atypical lobular hyperplasia. High risk, concordant. Surgical consultation is recommended.  9/13/23 ([facility]) B/L MRI: 1. No MR evidence of malignancy in either breast.   2. Indeterminate and nonspecific enhancement of the skin of the right upper axilla without underlying axillary abnormality. Recommend physical examination/clinical correlation for further evaluation. FOLLOW-UP: Annual screening.Clinical correlation and physical exam of the right axilla. BI-RADS 2:  Benign.   2/20/24 ([facility]) B/L sMMG/US: heterogeneously dense. The right 8:00 position 5cmfn demonstrated a 3 x 2 x 3 mm cyst. The left 10:00 position 4cmfn demonstrated a 2 x 2 by 3 mm complicated cyst. No mammographic or US evidence of malignancy.  FOLLOW-UP: Annual screening. BI-RADS 2:  Benign.

## 2024-03-12 NOTE — HISTORY OF PRESENT ILLNESS
[FreeTextEntry1] : 48 year old female, patient of Dr. Autumn Perez (OBN), presents for high risk screening with history of stereotactic biopsy on 3/13/23 of indeterminate calcifications in the L UIQ, pathology revealed fibrocystic and columnar cell changes with associated microcalcifications and ALH. Patient was referred to med onc Dr. Mace, discussed risk reduction via chemoprevention and was recommended Tamoxifen however patient deferred; prefers to be monitored closely via active surveillance.   Patient also with history of right breast surgical biopsy >20 years ago with reported benign results and history of L breast biopsy in 2019 also reportedly benign.   At previous OV, patient reported bilateral breast pain, described as an ache, sometimes sharp/shooting, unsure relation to menstrual cycles as she has been irregular.   B/L MRI 9/13/23 BIRADS-2. Most recent imaging, B/L sMMG/US 2/20/24 BIRADS-2  Patient denies palpable masses, nipple discharge, skin changes.   Patient denies family history of breast cancer. Denies famhx of ovarian cancer. Patient reports famhx of colon cancer in brother age 52 and Paunt age 80. At [facility], patient was referred to GI specialist for colon cancer screening, patient did not proceed????? Evelyn Lifetime Risk 41.3%

## 2024-03-15 ENCOUNTER — APPOINTMENT (OUTPATIENT)
Dept: BREAST CENTER | Facility: CLINIC | Age: 49
End: 2024-03-15

## 2024-03-15 DIAGNOSIS — Z12.39 ENCOUNTER FOR OTHER SCREENING FOR MALIGNANT NEOPLASM OF BREAST: ICD-10-CM

## 2024-03-15 DIAGNOSIS — Z91.89 OTHER SPECIFIED PERSONAL RISK FACTORS, NOT ELSEWHERE CLASSIFIED: ICD-10-CM

## 2024-03-15 DIAGNOSIS — N60.92 UNSPECIFIED BENIGN MAMMARY DYSPLASIA OF LEFT BREAST: ICD-10-CM

## 2024-03-21 ENCOUNTER — APPOINTMENT (OUTPATIENT)
Dept: OBGYN | Facility: CLINIC | Age: 49
End: 2024-03-21

## 2025-02-10 NOTE — DISCUSSION/SUMMARY
[FreeTextEntry1] : 48 year old s/p Laparoscopic supracervical hysterectomy, bilateral salpingectomy.presents for postoperative visit doing well \par -resume all activity without limitation \par -resume routine GYN care \par -referral for PFPT given to the patient\par -follow up in 2 months\par \par   chest pain

## 2025-07-30 ENCOUNTER — APPOINTMENT (OUTPATIENT)
Dept: MRI IMAGING | Facility: CLINIC | Age: 50
End: 2025-07-30
Payer: COMMERCIAL

## 2025-07-30 PROCEDURE — 77049 MRI BREAST C-+ W/CAD BI: CPT

## 2025-07-30 PROCEDURE — A9585: CPT

## (undated) DEVICE — TUBING STRYKER PNEUMOCLEAR SMOKE EVACUATION HIGH FLOW

## (undated) DEVICE — SPONGE ENDO PEANUT 5MM

## (undated) DEVICE — ENDOCATCH 10MM SPECIMEN POUCH

## (undated) DEVICE — DRSG DERMABOND 0.7ML

## (undated) DEVICE — TROCAR COVIDIEN VERSAONE FIXATION CANNULA 5MM

## (undated) DEVICE — SUT MONOCRYL 4-0 27" PS-2 UNDYED

## (undated) DEVICE — TUBING TUR 2 PRONG

## (undated) DEVICE — TROCAR APPLIED MEDICAL KII BALLOON BLUNT TIP 12MM X 100MM

## (undated) DEVICE — SUT VICRYL 0 27" UR-6

## (undated) DEVICE — POSITIONER PINK PAD PIGAZZI SYSTEM XL W ARM PROTECTOR

## (undated) DEVICE — TROCAR COVIDIEN VERSAPORT BLADELESS OPTICAL 5MM STANDARD

## (undated) DEVICE — INZII RETRIEVAL SYSTEM 12/15MM

## (undated) DEVICE — FOLEY TRAY 16FR 5CC LF UMETER CLOSED

## (undated) DEVICE — UTERINE MANIPULATOR CONMED VCARE MED 34MM

## (undated) DEVICE — PACK GENERAL LAPAROSCOPY

## (undated) DEVICE — MONOPOLAR CORD HI FREQ DISPOSABLE

## (undated) DEVICE — Device

## (undated) DEVICE — RUMI COLPO-PNEUMO OCCLUDER

## (undated) DEVICE — GLV 7 PROTEXIS (WHITE)

## (undated) DEVICE — NDL GRASPING DISP

## (undated) DEVICE — INSUFFLATION NDL COVIDIEN SURGINEEDLE VERESS 120MM

## (undated) DEVICE — TIP METZENBAUM SCISSOR MONOPOLAR ENDOCUT (ORANGE)

## (undated) DEVICE — SUT VLOC 180 0 12" GS-21 GREEN

## (undated) DEVICE — LIGASURE BLUNT TIP 37CM